# Patient Record
Sex: FEMALE | Race: WHITE | NOT HISPANIC OR LATINO | Employment: FULL TIME | URBAN - METROPOLITAN AREA
[De-identification: names, ages, dates, MRNs, and addresses within clinical notes are randomized per-mention and may not be internally consistent; named-entity substitution may affect disease eponyms.]

---

## 2023-08-18 PROBLEM — R22.0 LOCALIZED SWELLING, MASS, AND LUMP OF HEAD: Status: ACTIVE | Noted: 2023-08-18

## 2023-08-18 PROBLEM — Z15.01 BRCA2 POSITIVE: Status: ACTIVE | Noted: 2023-08-18

## 2023-08-18 PROBLEM — R30.0 DYSURIA: Status: ACTIVE | Noted: 2023-08-18

## 2023-08-18 PROBLEM — S83.207A TEAR OF MENISCUS OF LEFT KNEE: Status: ACTIVE | Noted: 2023-08-18

## 2023-08-18 PROBLEM — K21.9 GASTROESOPHAGEAL REFLUX DISEASE: Status: ACTIVE | Noted: 2023-08-18

## 2023-08-18 PROBLEM — M26.649 TMJ ARTHRITIS: Status: ACTIVE | Noted: 2023-08-18

## 2023-08-18 PROBLEM — Z15.09 BRCA2 POSITIVE: Status: ACTIVE | Noted: 2023-08-18

## 2023-08-18 PROBLEM — Z79.899 MEDICAL MARIJUANA USE: Status: ACTIVE | Noted: 2023-08-18

## 2023-08-18 PROBLEM — J38.2 VOCAL CORD NODULES: Status: ACTIVE | Noted: 2023-08-18

## 2023-08-18 PROBLEM — K11.8 PAROTID MASS: Status: ACTIVE | Noted: 2023-08-18

## 2023-08-18 PROBLEM — K64.9 HEMORRHOIDS: Status: ACTIVE | Noted: 2023-08-18

## 2023-08-18 PROBLEM — R49.0 DYSPHONIA: Status: ACTIVE | Noted: 2023-08-18

## 2023-08-18 PROBLEM — Z90.49 S/P CHOLECYSTECTOMY: Status: ACTIVE | Noted: 2023-08-18

## 2023-08-18 RX ORDER — OMEPRAZOLE 10 MG/1
10 CAPSULE, DELAYED RELEASE ORAL
COMMUNITY
End: 2024-01-03 | Stop reason: WASHOUT

## 2023-09-28 ENCOUNTER — APPOINTMENT (OUTPATIENT)
Dept: PRIMARY CARE | Facility: CLINIC | Age: 48
End: 2023-09-28
Payer: COMMERCIAL

## 2023-10-20 LAB
ALBUMIN SERPL BCP-MCNC: 3.8 G/DL (ref 3.4–5)
ALP SERPL-CCNC: 59 U/L (ref 33–110)
ALT SERPL W P-5'-P-CCNC: 10 U/L (ref 7–45)
ANION GAP SERPL CALC-SCNC: 10 MMOL/L (ref 10–20)
AST SERPL W P-5'-P-CCNC: 12 U/L (ref 9–39)
BASOPHILS # BLD AUTO: 0.06 X10*3/UL (ref 0–0.1)
BASOPHILS NFR BLD AUTO: 0.7 %
BILIRUB SERPL-MCNC: 0.4 MG/DL (ref 0–1.2)
BUN SERPL-MCNC: 10 MG/DL (ref 6–23)
CALCIUM SERPL-MCNC: 8.6 MG/DL (ref 8.6–10.3)
CHLORIDE SERPL-SCNC: 109 MMOL/L (ref 98–107)
CO2 SERPL-SCNC: 25 MMOL/L (ref 21–32)
CREAT SERPL-MCNC: 0.72 MG/DL (ref 0.5–1.05)
EOSINOPHIL # BLD AUTO: 0.35 X10*3/UL (ref 0–0.7)
EOSINOPHIL NFR BLD AUTO: 4.3 %
ERYTHROCYTE [DISTWIDTH] IN BLOOD BY AUTOMATED COUNT: 13.1 % (ref 11.5–14.5)
GFR SERPL CREATININE-BSD FRML MDRD: >90 ML/MIN/1.73M*2
GLUCOSE SERPL-MCNC: 103 MG/DL (ref 74–99)
HCT VFR BLD AUTO: 35.3 % (ref 36–46)
HGB BLD-MCNC: 11.3 G/DL (ref 12–16)
IMM GRANULOCYTES # BLD AUTO: 0.01 X10*3/UL (ref 0–0.7)
IMM GRANULOCYTES NFR BLD AUTO: 0.1 % (ref 0–0.9)
LIPASE SERPL-CCNC: 25 U/L (ref 9–82)
LYMPHOCYTES # BLD AUTO: 2.14 X10*3/UL (ref 1.2–4.8)
LYMPHOCYTES NFR BLD AUTO: 26.1 %
MCH RBC QN AUTO: 28.9 PG (ref 26–34)
MCHC RBC AUTO-ENTMCNC: 32 G/DL (ref 32–36)
MCV RBC AUTO: 90 FL (ref 80–100)
MONOCYTES # BLD AUTO: 0.6 X10*3/UL (ref 0.1–1)
MONOCYTES NFR BLD AUTO: 7.3 %
NEUTROPHILS # BLD AUTO: 5.04 X10*3/UL (ref 1.2–7.7)
NEUTROPHILS NFR BLD AUTO: 61.5 %
NRBC BLD-RTO: 0 /100 WBCS (ref 0–0)
PLATELET # BLD AUTO: 239 X10*3/UL (ref 150–450)
PMV BLD AUTO: 11.8 FL (ref 7.5–11.5)
POTASSIUM SERPL-SCNC: 4.2 MMOL/L (ref 3.5–5.3)
PROT SERPL-MCNC: 6.6 G/DL (ref 6.4–8.2)
RBC # BLD AUTO: 3.91 X10*6/UL (ref 4–5.2)
SODIUM SERPL-SCNC: 140 MMOL/L (ref 136–145)
WBC # BLD AUTO: 8.2 X10*3/UL (ref 4.4–11.3)

## 2023-10-20 PROCEDURE — 83690 ASSAY OF LIPASE: CPT | Performed by: EMERGENCY MEDICINE

## 2023-10-20 PROCEDURE — 80053 COMPREHEN METABOLIC PANEL: CPT | Performed by: EMERGENCY MEDICINE

## 2023-10-20 PROCEDURE — 85025 COMPLETE CBC W/AUTO DIFF WBC: CPT | Performed by: EMERGENCY MEDICINE

## 2023-10-20 PROCEDURE — 96375 TX/PRO/DX INJ NEW DRUG ADDON: CPT

## 2023-10-20 PROCEDURE — 84702 CHORIONIC GONADOTROPIN TEST: CPT

## 2023-10-20 PROCEDURE — 96365 THER/PROPH/DIAG IV INF INIT: CPT

## 2023-10-20 PROCEDURE — 99285 EMERGENCY DEPT VISIT HI MDM: CPT | Performed by: EMERGENCY MEDICINE

## 2023-10-20 PROCEDURE — 99284 EMERGENCY DEPT VISIT MOD MDM: CPT | Performed by: EMERGENCY MEDICINE

## 2023-10-20 PROCEDURE — 36415 COLL VENOUS BLD VENIPUNCTURE: CPT | Performed by: EMERGENCY MEDICINE

## 2023-10-20 ASSESSMENT — COLUMBIA-SUICIDE SEVERITY RATING SCALE - C-SSRS
2. HAVE YOU ACTUALLY HAD ANY THOUGHTS OF KILLING YOURSELF?: NO
1. IN THE PAST MONTH, HAVE YOU WISHED YOU WERE DEAD OR WISHED YOU COULD GO TO SLEEP AND NOT WAKE UP?: NO
6. HAVE YOU EVER DONE ANYTHING, STARTED TO DO ANYTHING, OR PREPARED TO DO ANYTHING TO END YOUR LIFE?: NO

## 2023-10-20 ASSESSMENT — PAIN - FUNCTIONAL ASSESSMENT: PAIN_FUNCTIONAL_ASSESSMENT: 0-10

## 2023-10-20 ASSESSMENT — PAIN SCALES - GENERAL: PAINLEVEL_OUTOF10: 7

## 2023-10-21 ENCOUNTER — HOSPITAL ENCOUNTER (EMERGENCY)
Facility: HOSPITAL | Age: 48
Discharge: HOME | End: 2023-10-21
Attending: EMERGENCY MEDICINE
Payer: COMMERCIAL

## 2023-10-21 ENCOUNTER — APPOINTMENT (OUTPATIENT)
Dept: RADIOLOGY | Facility: HOSPITAL | Age: 48
End: 2023-10-21
Payer: COMMERCIAL

## 2023-10-21 VITALS
BODY MASS INDEX: 40.12 KG/M2 | OXYGEN SATURATION: 98 % | SYSTOLIC BLOOD PRESSURE: 140 MMHG | RESPIRATION RATE: 16 BRPM | HEART RATE: 64 BPM | WEIGHT: 218 LBS | TEMPERATURE: 97.5 F | DIASTOLIC BLOOD PRESSURE: 81 MMHG | HEIGHT: 62 IN

## 2023-10-21 DIAGNOSIS — R10.84 ABDOMINAL PAIN, GENERALIZED: ICD-10-CM

## 2023-10-21 DIAGNOSIS — K57.92 DIVERTICULITIS: Primary | ICD-10-CM

## 2023-10-21 LAB
APPEARANCE UR: ABNORMAL
B-HCG SERPL-ACNC: <2 MIU/ML
BILIRUB UR STRIP.AUTO-MCNC: NEGATIVE MG/DL
COLOR UR: YELLOW
GLUCOSE UR STRIP.AUTO-MCNC: NEGATIVE MG/DL
HOLD SPECIMEN: NORMAL
KETONES UR STRIP.AUTO-MCNC: ABNORMAL MG/DL
LEUKOCYTE ESTERASE UR QL STRIP.AUTO: ABNORMAL
NITRITE UR QL STRIP.AUTO: NEGATIVE
PH UR STRIP.AUTO: 6 [PH]
PROT UR STRIP.AUTO-MCNC: NEGATIVE MG/DL
RBC # UR STRIP.AUTO: ABNORMAL /UL
RBC #/AREA URNS AUTO: >20 /HPF
SP GR UR STRIP.AUTO: 1.02
SQUAMOUS #/AREA URNS AUTO: ABNORMAL /HPF
UROBILINOGEN UR STRIP.AUTO-MCNC: <2 MG/DL
WBC #/AREA URNS AUTO: ABNORMAL /HPF

## 2023-10-21 PROCEDURE — 2500000001 HC RX 250 WO HCPCS SELF ADMINISTERED DRUGS (ALT 637 FOR MEDICARE OP): Performed by: EMERGENCY MEDICINE

## 2023-10-21 PROCEDURE — 74177 CT ABD & PELVIS W/CONTRAST: CPT

## 2023-10-21 PROCEDURE — 87086 URINE CULTURE/COLONY COUNT: CPT | Performed by: EMERGENCY MEDICINE

## 2023-10-21 PROCEDURE — 2550000001 HC RX 255 CONTRASTS

## 2023-10-21 PROCEDURE — 2500000004 HC RX 250 GENERAL PHARMACY W/ HCPCS (ALT 636 FOR OP/ED)

## 2023-10-21 PROCEDURE — 2550000001 HC RX 255 CONTRASTS: Performed by: EMERGENCY MEDICINE

## 2023-10-21 PROCEDURE — 74177 CT ABD & PELVIS W/CONTRAST: CPT | Performed by: RADIOLOGY

## 2023-10-21 PROCEDURE — 87086 URINE CULTURE/COLONY COUNT: CPT | Mod: STJLAB | Performed by: EMERGENCY MEDICINE

## 2023-10-21 PROCEDURE — 81001 URINALYSIS AUTO W/SCOPE: CPT | Performed by: EMERGENCY MEDICINE

## 2023-10-21 RX ORDER — AMOXICILLIN AND CLAVULANATE POTASSIUM 875; 125 MG/1; MG/1
1 TABLET, FILM COATED ORAL EVERY 12 HOURS
Qty: 14 TABLET | Refills: 0 | Status: SHIPPED | OUTPATIENT
Start: 2023-10-21 | End: 2023-10-28

## 2023-10-21 RX ORDER — OXYCODONE AND ACETAMINOPHEN 5; 325 MG/1; MG/1
1 TABLET ORAL ONCE
Status: COMPLETED | OUTPATIENT
Start: 2023-10-21 | End: 2023-10-21

## 2023-10-21 RX ORDER — ONDANSETRON HYDROCHLORIDE 2 MG/ML
4 INJECTION, SOLUTION INTRAVENOUS ONCE
Status: COMPLETED | OUTPATIENT
Start: 2023-10-21 | End: 2023-10-21

## 2023-10-21 RX ORDER — HYDROCODONE BITARTRATE AND ACETAMINOPHEN 5; 325 MG/1; MG/1
1 TABLET ORAL EVERY 6 HOURS PRN
Qty: 10 TABLET | Refills: 0 | Status: SHIPPED | OUTPATIENT
Start: 2023-10-21 | End: 2023-11-08 | Stop reason: ALTCHOICE

## 2023-10-21 RX ADMIN — ONDANSETRON 4 MG: 2 INJECTION INTRAMUSCULAR; INTRAVENOUS at 04:42

## 2023-10-21 RX ADMIN — HYDROMORPHONE HYDROCHLORIDE 0.5 MG: 1 INJECTION, SOLUTION INTRAMUSCULAR; INTRAVENOUS; SUBCUTANEOUS at 04:42

## 2023-10-21 RX ADMIN — PIPERACILLIN SODIUM AND TAZOBACTAM SODIUM 3.38 G: 3; .375 INJECTION, SOLUTION INTRAVENOUS at 04:42

## 2023-10-21 RX ADMIN — SODIUM CHLORIDE, POTASSIUM CHLORIDE, SODIUM LACTATE AND CALCIUM CHLORIDE 1000 ML: 600; 310; 30; 20 INJECTION, SOLUTION INTRAVENOUS at 04:42

## 2023-10-21 RX ADMIN — IOHEXOL 75 ML: 350 INJECTION, SOLUTION INTRAVENOUS at 02:59

## 2023-10-21 RX ADMIN — OXYCODONE HYDROCHLORIDE AND ACETAMINOPHEN 1 TABLET: 5; 325 TABLET ORAL at 06:27

## 2023-10-21 ASSESSMENT — LIFESTYLE VARIABLES
REASON UNABLE TO ASSESS: NO
HAVE YOU EVER FELT YOU SHOULD CUT DOWN ON YOUR DRINKING: NO
HAVE PEOPLE ANNOYED YOU BY CRITICIZING YOUR DRINKING: NO
EVER FELT BAD OR GUILTY ABOUT YOUR DRINKING: NO
EVER HAD A DRINK FIRST THING IN THE MORNING TO STEADY YOUR NERVES TO GET RID OF A HANGOVER: NO

## 2023-10-21 ASSESSMENT — PAIN SCALES - GENERAL
PAINLEVEL_OUTOF10: 8
PAINLEVEL_OUTOF10: 3

## 2023-10-21 NOTE — ED PROVIDER NOTES
HPI   Chief Complaint   Patient presents with    Abdominal Pain     LUQ pain, states hx of diverticulitis, pain started tonight       48-year-old female present with chief complaint of left-sided abdominal pain.  States he has a long history of diverticulitis and states that this pain is related to this.  She came in today because it has become worse.  Otherwise denies any chest pain or shortness of breath.  No nausea or vomiting.  No blood in the stool blood in urine.  No fevers chills.  She has not been taking anything at home for this pain.  Denies any systemic signs or symptoms of infection.      History provided by:  Patient   used: No                        [place] Coma Scale Score: 15                  Patient History   Past Medical History:   Diagnosis Date    Nodules of vocal cords     Vocal fold nodule     Past Surgical History:   Procedure Laterality Date    GALLBLADDER SURGERY  05/30/2014    Gallbladder Surgery     Family History   Problem Relation Name Age of Onset    Other (BRCA GENE POSITIVE) Mother      Hypertension Other      Diabetes Other       Social History     Tobacco Use    Smoking status: Former     Types: Cigarettes    Smokeless tobacco: Never   Substance Use Topics    Alcohol use: Yes    Drug use: Yes     Types: Marijuana       Physical Exam   ED Triage Vitals [10/20/23 2224]   Temp Heart Rate Resp BP   36.4 °C (97.5 °F) 93 18 142/71      SpO2 Temp src Heart Rate Source Patient Position   98 % -- -- --      BP Location FiO2 (%)     -- --       Physical Exam  Vitals and nursing note reviewed.   Constitutional:       General: She is not in acute distress.     Appearance: Normal appearance. She is not ill-appearing or toxic-appearing.   HENT:      Head: Normocephalic and atraumatic.      Right Ear: External ear normal.      Left Ear: External ear normal.      Nose: Nose normal.   Eyes:      General:         Right eye: No discharge.         Left eye: No discharge.       Extraocular Movements: Extraocular movements intact.      Conjunctiva/sclera: Conjunctivae normal.      Pupils: Pupils are equal, round, and reactive to light.   Cardiovascular:      Rate and Rhythm: Normal rate and regular rhythm.      Pulses: Normal pulses.      Heart sounds: Normal heart sounds. No murmur heard.  Pulmonary:      Effort: Pulmonary effort is normal.      Breath sounds: Normal breath sounds.   Abdominal:      General: There is no distension.      Palpations: Abdomen is soft. There is no mass.      Tenderness: There is abdominal tenderness in the left upper quadrant and left lower quadrant. There is guarding.   Musculoskeletal:         General: No deformity or signs of injury. Normal range of motion.   Skin:     General: Skin is warm and dry.   Neurological:      General: No focal deficit present.      Mental Status: She is alert and oriented to person, place, and time. Mental status is at baseline.   Psychiatric:         Mood and Affect: Mood normal.         Behavior: Behavior normal.         ED Course & MDM   Diagnoses as of 10/21/23 2224   Diverticulitis   Abdominal pain, generalized       Medical Decision Making  48-year-old female present with chief complaint of  left side abdominal pain that she states is related to her diverticulitis.  She has been on antibiotics recently , but she did not take these correctly she took her Cipro first and then the Flagyl afterwards she is not sure that she is supposed to take that at the same time.  Patient has been having worsening pain.  Concern for worsening of her diverticulitis.  At this time we will obtain a CT of the abdomen pelvis IV contrast to evaluate for worsening of her diverticulitis and any evidence of perforation or abscess formation.  We will give a dose of Zosyn empirically.  She is also given IV Zofran and Dilaudid for pain management.    CT scan did show evidence of diverticulitis although this is uncomplicated.  Lab work is overall  unremarkable.  Patient is overall stable right now and her pain is better controlled after she received IV medications.  No need for further work-up in the ER or admission to the hospital at this point.  She was advised to follow-up with her primary care provider and with the surgery as outpatient for ongoing management and evaluation of her symptoms.  She was given a prescription for antibiotics and pain management with Norco.  Return precautions were discussed.  She was discharged home stable condition.    Please see ED course for the rest of the MDM.    Mazin Toro DO, PGY-2  Emergency Medicine    Plan of care discussed with the attending physician.        Procedure  Procedures     Mazin Toro DO  Resident  10/21/23 0730

## 2023-10-22 LAB — BACTERIA UR CULT: NORMAL

## 2023-10-24 ENCOUNTER — APPOINTMENT (OUTPATIENT)
Dept: SURGICAL ONCOLOGY | Facility: HOSPITAL | Age: 48
End: 2023-10-24
Payer: COMMERCIAL

## 2023-11-01 ENCOUNTER — TELEPHONE (OUTPATIENT)
Dept: OBSTETRICS AND GYNECOLOGY | Facility: CLINIC | Age: 48
End: 2023-11-01
Payer: COMMERCIAL

## 2023-11-01 NOTE — TELEPHONE ENCOUNTER
Pt previously had diverticulitis 3 weeks ago and was in the hospital a week later, she then took 3 rounds of antibiotics and believes it caused a yeast infection. She goes on vacation tomorrow at 3pm and is requesting a pill she had been given previously in the past to treat. She said she's tried at home methods that haven't helped. I tried to look for an appt for tomorrow early morning and do not yet see anything. Let me know what I am able to tell her as I know we normally have to see the pt to prescribe

## 2023-11-02 NOTE — TELEPHONE ENCOUNTER
Spoke with pt and explained that since she not been seen in a year we would not be able to prescribe anything unless she is seen. Pt verbalized her understanding and I did advise that she try the Monistat 1 or 7 day treatment since she is leaving for vacation today at 3pm. Pt will try this. While on the phone, I  did get her scheduled for an annual exam with Laura on 1/30/2024. Pt states she is BRCA positive and would like to discuss what this means GYN wise at her appointment. No further questions at this time.

## 2023-11-08 ENCOUNTER — OFFICE VISIT (OUTPATIENT)
Dept: OBSTETRICS AND GYNECOLOGY | Facility: CLINIC | Age: 48
End: 2023-11-08
Payer: COMMERCIAL

## 2023-11-08 VITALS
WEIGHT: 219 LBS | SYSTOLIC BLOOD PRESSURE: 122 MMHG | HEIGHT: 62 IN | DIASTOLIC BLOOD PRESSURE: 78 MMHG | BODY MASS INDEX: 40.3 KG/M2

## 2023-11-08 DIAGNOSIS — N76.0 ACUTE VAGINITIS: Primary | ICD-10-CM

## 2023-11-08 DIAGNOSIS — B37.31 CANDIDA VAGINITIS: ICD-10-CM

## 2023-11-08 PROCEDURE — 99212 OFFICE O/P EST SF 10 MIN: CPT | Performed by: ADVANCED PRACTICE MIDWIFE

## 2023-11-08 PROCEDURE — 87210 SMEAR WET MOUNT SALINE/INK: CPT | Performed by: ADVANCED PRACTICE MIDWIFE

## 2023-11-08 PROCEDURE — 1036F TOBACCO NON-USER: CPT | Performed by: ADVANCED PRACTICE MIDWIFE

## 2023-11-08 RX ORDER — FLUCONAZOLE 150 MG/1
150 TABLET ORAL ONCE
Qty: 1 TABLET | Refills: 0 | Status: SHIPPED | OUTPATIENT
Start: 2023-11-08 | End: 2023-11-08

## 2023-11-08 NOTE — PROGRESS NOTES
Subjective   Patient ID: Di Valentin is a 48 y.o. female who presents for Vaginitis/Bacterial Vaginosis.      HPI  Patient having abnormal vaginal itching, and discharge (Started two weeks ago)  Reports multiple antibiotic use recently  Patient used Monistat 3 and it did not work.    Review of Systems  See HPI    Objective   Physical Exam  Constitutional:       General: She is not in acute distress.  Pulmonary:      Effort: Pulmonary effort is normal.   Genitourinary:     General: Normal vulva.      Cervix: Normal.      Uterus: Normal.       Adnexa: Right adnexa normal and left adnexa normal.      Comments: Scant white vaginal discharge  Skin:     General: Skin is warm and dry.      Findings: No lesion.   Neurological:      Mental Status: She is alert.      See [place]    Assessment/Plan   Diagnoses and all orders for this visit:  Acute vaginitis  -     POCT [place] manually resulted  Candida vaginitis  -     fluconazole (Diflucan) 150 mg tablet; Take 1 tablet (150 mg) by mouth 1 time for 1 dose.    RTC for symptoms not resolving or PRN

## 2023-11-11 LAB
POC CLUE CELL WET PREP: ABNORMAL
POC TRICHOMONAS WET PREP: ABNORMAL
POC WBC WET PREP: ABNORMAL
POC YEAST CELLS WET PREP: PRESENT

## 2023-11-27 ENCOUNTER — TELEMEDICINE (OUTPATIENT)
Dept: GASTROENTEROLOGY | Facility: CLINIC | Age: 48
End: 2023-11-27
Payer: COMMERCIAL

## 2023-11-27 DIAGNOSIS — K57.92 DIVERTICULITIS: Primary | ICD-10-CM

## 2023-11-27 PROCEDURE — 99204 OFFICE O/P NEW MOD 45 MIN: CPT | Performed by: NURSE PRACTITIONER

## 2023-11-27 ASSESSMENT — ENCOUNTER SYMPTOMS
HEMATOLOGIC/LYMPHATIC NEGATIVE: 1
RESPIRATORY NEGATIVE: 1
COUGH: 0
ALLERGIC/IMMUNOLOGIC NEGATIVE: 1
MUSCULOSKELETAL NEGATIVE: 1
WHEEZING: 0
STRIDOR: 0
DIFFICULTY URINATING: 0
PSYCHIATRIC NEGATIVE: 1
APNEA: 0
ROS GI COMMENTS: SEE HPI
ENDOCRINE NEGATIVE: 1
CARDIOVASCULAR NEGATIVE: 1
FATIGUE: 0
NEUROLOGICAL NEGATIVE: 1
CHILLS: 0
SHORTNESS OF BREATH: 0
EYES NEGATIVE: 1
CHEST TIGHTNESS: 0
DIAPHORESIS: 0
FEVER: 0

## 2023-11-27 NOTE — PROGRESS NOTES
Subjective   Patient ID: Di Valentin is a 48 y.o. female who presents for Diverticulitis.  Diverticulitis a year ago, CT at that time and she was treated with ATBs, avoid Nuts and Seeds. Had to have 2 rounds  About a week ago, she was in [place] and started having pain, she had a CT x 2 and both showed diverticulitis.  The initial CT scan was in [place], she was in severe pain at this time. She was given Cefir and Flagyl and dilaudid. The pain returned a week later. She returned to ER in [place].     Currently, everything has resolved.     She has BRCA gene.     She is gluten free, plant based diet.     She has not had a colonoscopy, friend had a colonoscopy and had to have a bag    Family Hx: She denies a family hx of CRC, GI cancer  Mom and m. Grandmother have BRCA and ovarian cancer (grandmother)          Review of Systems   Constitutional:  Negative for chills, diaphoresis, fatigue and fever.   HENT: Negative.     Eyes: Negative.    Respiratory: Negative.  Negative for apnea, cough, chest tightness, shortness of breath, wheezing and stridor.    Cardiovascular: Negative.    Gastrointestinal:         See HPI    Endocrine: Negative.    Genitourinary: Negative.  Negative for difficulty urinating.   Musculoskeletal: Negative.    Skin: Negative.    Allergic/Immunologic: Negative.    Neurological: Negative.    Hematological: Negative.    Psychiatric/Behavioral: Negative.         Objective   Physical Exam  Constitutional:       Appearance: She is normal weight.   Neurological:      Mental Status: She is alert.   Psychiatric:         Mood and Affect: Mood normal.        47 yo female with a PMH of obesity, cholecystectomy, parotid mass who presents today for follow up of diverticulitis infection. She has had two prior attacks of diverticulitis within the last two years. She is on a high fiber diet. She has never had a colonoscopy. She will undergo a colonoscopy and start fiber.     Assessment/Plan   Diagnoses and all orders  for this visit:  Diverticulitis  -     Colonoscopy Screening; Average Risk Patient; Future

## 2023-12-17 ENCOUNTER — TELEPHONE (OUTPATIENT)
Dept: HEMATOLOGY/ONCOLOGY | Facility: HOSPITAL | Age: 48
End: 2023-12-17
Payer: COMMERCIAL

## 2023-12-17 DIAGNOSIS — Z15.01 BRCA2 POSITIVE: Primary | ICD-10-CM

## 2023-12-17 DIAGNOSIS — Z15.09 BRCA2 POSITIVE: Primary | ICD-10-CM

## 2023-12-17 DIAGNOSIS — Z91.89 AT HIGH RISK FOR BREAST CANCER: ICD-10-CM

## 2023-12-17 DIAGNOSIS — Z12.39 BREAST CANCER SCREENING, HIGH RISK PATIENT: ICD-10-CM

## 2023-12-17 NOTE — TELEPHONE ENCOUNTER
Spoke with Di regarding her breast cancer screening.  She had her mammogram in 7/2023, so MRI would be due 1/2024.  Based on BRCA mutation, I do recommend alternating mammogram/MRI.  Order placed.

## 2023-12-20 ENCOUNTER — TELEPHONE (OUTPATIENT)
Dept: HEMATOLOGY/ONCOLOGY | Facility: CLINIC | Age: 48
End: 2023-12-20

## 2023-12-20 DIAGNOSIS — Z15.09 BRCA2 POSITIVE: Primary | ICD-10-CM

## 2023-12-20 DIAGNOSIS — Z91.89 AT HIGH RISK FOR BREAST CANCER: ICD-10-CM

## 2023-12-20 DIAGNOSIS — Z12.39 BREAST CANCER SCREENING, HIGH RISK PATIENT: ICD-10-CM

## 2023-12-20 DIAGNOSIS — Z15.01 BRCA2 POSITIVE: Primary | ICD-10-CM

## 2024-01-03 DIAGNOSIS — Z12.11 COLON CANCER SCREENING: ICD-10-CM

## 2024-01-03 PROBLEM — F41.9 ANXIETY: Status: ACTIVE | Noted: 2024-01-03

## 2024-01-03 PROBLEM — K21.9 GERD (GASTROESOPHAGEAL REFLUX DISEASE): Status: ACTIVE | Noted: 2024-01-03

## 2024-01-03 RX ORDER — ACETAMINOPHEN 500 MG
1 TABLET ORAL
COMMUNITY
Start: 2014-02-11 | End: 2024-01-30 | Stop reason: WASHOUT

## 2024-01-03 RX ORDER — LIDOCAINE 50 MG/G
PATCH TOPICAL
COMMUNITY
Start: 2023-10-07 | End: 2024-01-30 | Stop reason: WASHOUT

## 2024-01-03 RX ORDER — ASCORBIC ACID 500 MG
500 TABLET ORAL
COMMUNITY
Start: 2011-06-13 | End: 2024-01-30 | Stop reason: WASHOUT

## 2024-01-03 RX ORDER — FERROUS SULFATE 325(65) MG
1 TABLET ORAL
COMMUNITY
Start: 2021-11-05 | End: 2024-01-30 | Stop reason: WASHOUT

## 2024-01-03 RX ORDER — TRAMADOL HYDROCHLORIDE 50 MG/1
TABLET ORAL
COMMUNITY
Start: 2022-02-14 | End: 2024-01-03 | Stop reason: WASHOUT

## 2024-01-03 RX ORDER — CLONAZEPAM 0.5 MG/1
TABLET ORAL
COMMUNITY
Start: 2023-12-15 | End: 2024-01-30 | Stop reason: WASHOUT

## 2024-01-03 RX ORDER — LANOLIN ALCOHOL/MO/W.PET/CERES
1000 CREAM (GRAM) TOPICAL
COMMUNITY
Start: 2014-02-11 | End: 2024-01-30 | Stop reason: WASHOUT

## 2024-01-03 RX ORDER — OMEPRAZOLE 20 MG/1
20 CAPSULE, DELAYED RELEASE ORAL DAILY
COMMUNITY
Start: 2023-08-21

## 2024-01-07 RX ORDER — SODIUM, POTASSIUM,MAG SULFATES 17.5-3.13G
1 SOLUTION, RECONSTITUTED, ORAL ORAL EVERY 12 HOURS
Qty: 2 EACH | Refills: 0 | Status: SHIPPED | OUTPATIENT
Start: 2024-01-07

## 2024-01-08 ENCOUNTER — ANESTHESIA (OUTPATIENT)
Dept: GASTROENTEROLOGY | Facility: HOSPITAL | Age: 49
End: 2024-01-08
Payer: COMMERCIAL

## 2024-01-08 ENCOUNTER — HOSPITAL ENCOUNTER (OUTPATIENT)
Dept: GASTROENTEROLOGY | Facility: HOSPITAL | Age: 49
Setting detail: OUTPATIENT SURGERY
Discharge: HOME | End: 2024-01-08
Payer: COMMERCIAL

## 2024-01-08 ENCOUNTER — ANESTHESIA EVENT (OUTPATIENT)
Dept: GASTROENTEROLOGY | Facility: HOSPITAL | Age: 49
End: 2024-01-08
Payer: COMMERCIAL

## 2024-01-08 VITALS
SYSTOLIC BLOOD PRESSURE: 119 MMHG | TEMPERATURE: 98.1 F | OXYGEN SATURATION: 100 % | RESPIRATION RATE: 17 BRPM | HEIGHT: 62 IN | WEIGHT: 213 LBS | BODY MASS INDEX: 39.2 KG/M2 | HEART RATE: 64 BPM | DIASTOLIC BLOOD PRESSURE: 77 MMHG

## 2024-01-08 DIAGNOSIS — K57.92 DIVERTICULITIS: ICD-10-CM

## 2024-01-08 LAB — HCG UR QL IA.RAPID: NEGATIVE

## 2024-01-08 PROCEDURE — 81025 URINE PREGNANCY TEST: CPT | Performed by: INTERNAL MEDICINE

## 2024-01-08 PROCEDURE — 7100000009 HC PHASE TWO TIME - INITIAL BASE CHARGE

## 2024-01-08 PROCEDURE — 2500000005 HC RX 250 GENERAL PHARMACY W/O HCPCS: Performed by: NURSE ANESTHETIST, CERTIFIED REGISTERED

## 2024-01-08 PROCEDURE — A45378 PR COLONOSCOPY,DIAGNOSTIC: Performed by: ANESTHESIOLOGY

## 2024-01-08 PROCEDURE — 3700000001 HC GENERAL ANESTHESIA TIME - INITIAL BASE CHARGE

## 2024-01-08 PROCEDURE — 7100000010 HC PHASE TWO TIME - EACH INCREMENTAL 1 MINUTE

## 2024-01-08 PROCEDURE — A45378 PR COLONOSCOPY,DIAGNOSTIC: Performed by: NURSE ANESTHETIST, CERTIFIED REGISTERED

## 2024-01-08 PROCEDURE — 45378 DIAGNOSTIC COLONOSCOPY: CPT | Performed by: INTERNAL MEDICINE

## 2024-01-08 PROCEDURE — 3700000002 HC GENERAL ANESTHESIA TIME - EACH INCREMENTAL 1 MINUTE

## 2024-01-08 PROCEDURE — 2500000004 HC RX 250 GENERAL PHARMACY W/ HCPCS (ALT 636 FOR OP/ED): Performed by: NURSE ANESTHETIST, CERTIFIED REGISTERED

## 2024-01-08 RX ORDER — SODIUM CHLORIDE, SODIUM LACTATE, POTASSIUM CHLORIDE, CALCIUM CHLORIDE 600; 310; 30; 20 MG/100ML; MG/100ML; MG/100ML; MG/100ML
INJECTION, SOLUTION INTRAVENOUS CONTINUOUS PRN
Status: DISCONTINUED | OUTPATIENT
Start: 2024-01-08 | End: 2024-01-08

## 2024-01-08 RX ORDER — MIDAZOLAM HYDROCHLORIDE 1 MG/ML
INJECTION, SOLUTION INTRAMUSCULAR; INTRAVENOUS AS NEEDED
Status: DISCONTINUED | OUTPATIENT
Start: 2024-01-08 | End: 2024-01-08

## 2024-01-08 RX ORDER — PROPOFOL 10 MG/ML
INJECTION, EMULSION INTRAVENOUS CONTINUOUS PRN
Status: DISCONTINUED | OUTPATIENT
Start: 2024-01-08 | End: 2024-01-08

## 2024-01-08 RX ORDER — LIDOCAINE HYDROCHLORIDE 20 MG/ML
INJECTION, SOLUTION EPIDURAL; INFILTRATION; INTRACAUDAL; PERINEURAL AS NEEDED
Status: DISCONTINUED | OUTPATIENT
Start: 2024-01-08 | End: 2024-01-08

## 2024-01-08 RX ORDER — PROPOFOL 10 MG/ML
INJECTION, EMULSION INTRAVENOUS AS NEEDED
Status: DISCONTINUED | OUTPATIENT
Start: 2024-01-08 | End: 2024-01-08

## 2024-01-08 RX ADMIN — MIDAZOLAM 2 MG: 1 INJECTION INTRAMUSCULAR; INTRAVENOUS at 08:48

## 2024-01-08 RX ADMIN — PROPOFOL 20 MG: 10 INJECTION, EMULSION INTRAVENOUS at 08:55

## 2024-01-08 RX ADMIN — LIDOCAINE HYDROCHLORIDE 50 MG: 20 INJECTION, SOLUTION EPIDURAL; INFILTRATION; INTRACAUDAL; PERINEURAL at 08:52

## 2024-01-08 RX ADMIN — PROPOFOL 30 MG: 10 INJECTION, EMULSION INTRAVENOUS at 08:52

## 2024-01-08 RX ADMIN — PROPOFOL 30 MG: 10 INJECTION, EMULSION INTRAVENOUS at 08:53

## 2024-01-08 RX ADMIN — PROPOFOL 150 MCG/KG/MIN: 10 INJECTION, EMULSION INTRAVENOUS at 08:52

## 2024-01-08 RX ADMIN — SODIUM CHLORIDE, POTASSIUM CHLORIDE, SODIUM LACTATE AND CALCIUM CHLORIDE: 600; 310; 30; 20 INJECTION, SOLUTION INTRAVENOUS at 08:48

## 2024-01-08 SDOH — HEALTH STABILITY: MENTAL HEALTH: CURRENT SMOKER: 0

## 2024-01-08 ASSESSMENT — PAIN SCALES - GENERAL
PAINLEVEL_OUTOF10: 0 - NO PAIN

## 2024-01-08 ASSESSMENT — COLUMBIA-SUICIDE SEVERITY RATING SCALE - C-SSRS
1. IN THE PAST MONTH, HAVE YOU WISHED YOU WERE DEAD OR WISHED YOU COULD GO TO SLEEP AND NOT WAKE UP?: NO
2. HAVE YOU ACTUALLY HAD ANY THOUGHTS OF KILLING YOURSELF?: NO
6. HAVE YOU EVER DONE ANYTHING, STARTED TO DO ANYTHING, OR PREPARED TO DO ANYTHING TO END YOUR LIFE?: NO

## 2024-01-08 ASSESSMENT — PAIN - FUNCTIONAL ASSESSMENT
PAIN_FUNCTIONAL_ASSESSMENT: 0-10

## 2024-01-08 NOTE — ANESTHESIA POSTPROCEDURE EVALUATION
Patient: Di Valentin    Procedure Summary       Date: 01/08/24 [facility]    Anesthesia Start: 0848 Anesthesia Stop: 0918    Procedure: COLONOSCOPY Diagnosis: Diverticulitis    Scheduled Providers: Isabel BECK MD Responsible Provider: Boo Chavez MD    Anesthesia Type: MAC ASA Status: 2            Anesthesia Type: MAC    Vitals Value Taken Time   /68 01/08/24 0933   Temp 36.4 °C (97.5 °F) 01/08/24 0921   Pulse 69 01/08/24 0933   Resp 18 01/08/24 0933   SpO2 96 % 01/08/24 0933       Anesthesia Post Evaluation    Patient location during evaluation: PACU  Patient participation: complete - patient cannot participate  Level of consciousness: awake and alert  Pain management: satisfactory to patient  Airway patency: patent  Cardiovascular status: acceptable  Respiratory status: acceptable  Hydration status: acceptable  Postoperative Nausea and Vomiting: none        No notable events documented.

## 2024-01-08 NOTE — H&P
Outpatient Hospital Procedure    Patient Profile-Procedures  Initial Info  Patient Demographics  Name Di Valentin  Date of Birth 1975  MRN [id number]  Address   [address] [facility] [id number] [address] [facility] [id number]    Primary Phone Number [phone]  Secondary Phone Number    Edna Lin    Procedures   Colonoscopy      Indication:  Screening - hx of diverticulitis    Primary contact name and number   Extended Emergency Contact Information  Primary Emergency Contact: Uriel Clemons  Home Phone: [phone]  Relation: Parent    General Health  Weight   Vitals:    01/08/24 0809   Weight: 96.6 kg (213 lb)     BMI Body mass index is 38.96 kg/m².    Allergies  Allergies   Allergen Reactions    Morphine Hives and Itching       Past Medical History   Past Medical History:   Diagnosis Date    Nodules of vocal cords     Vocal fold nodule       Provider assessment  Diagnosis  Medication Reviewed - yes  Prior to Admission medications    Medication Sig Start Date End Date Taking? Authorizing Provider   ascorbic acid (Vitamin C) 500 mg tablet Take 1 tablet (500 mg) by mouth once daily. 6/13/11  Yes Historical Provider, MD   cholecalciferol (Vitamin D-3) 50 mcg (2,000 unit) capsule Take 1 capsule (50 mcg) by mouth once daily. 2/11/14  Yes Historical Provider, MD   clonazePAM (KlonoPIN) 0.5 mg tablet TAKE 1 TABLET BY MOUTH ONCE DAILY FOR 2 DOSES. CAN TAKE DAY BEFORE AND DAY OF COLONOSCOPY. 12/15/23  Yes Historical Provider, MD   cyanocobalamin (Vitamin B-12) 1,000 mcg tablet Take 1 tablet (1,000 mcg) by mouth once daily. 2/11/14  Yes Historical Provider, MD   ferrous sulfate, 325 mg ferrous sulfate, tablet Take 1 tablet by mouth once daily with breakfast. 11/5/21  Yes Historical Provider, MD   MULTIVITAMIN ORAL Take by mouth.   Yes Historical Provider, MD   omega-3 fatty acids 500 mg capsule Take 1 capsule (500 mg) by mouth once daily. 2/11/14  Yes Historical Provider, MD   omeprazole  (PriLOSEC) 20 mg DR capsule Take 1 capsule (20 mg) by mouth once daily. 8/21/23  Yes Historical Provider, MD   sodium,potassium,mag sulfates (Suprep Bowel Prep Kit) 17.5-3.13-1.6 gram recon soln solution Take 1 bottle by mouth every 12 hours. 1/7/24  Yes Isabel BECK MD   lidocaine (Lidoderm) 5 % patch  10/7/23   Historical Provider, MD   omeprazole (PriLOSEC) 10 mg DR capsule Take 1 capsule (10 mg) by mouth once daily in the morning. Take before meals.  1/3/24  Historical Provider, MD   traMADol (Ultram) 50 mg tablet Take 1 tablet 1 hour prior to bed time as needed. 2/14/22 1/3/24  Historical Provider, MD       This is my H&P    Physical Exam  Physical Exam  Vitals reviewed.   Constitutional:       General: She is awake.   Cardiovascular:      Rate and Rhythm: Normal rate and regular rhythm.   Pulmonary:      Effort: Pulmonary effort is normal.      Breath sounds: Normal breath sounds.   Neurological:      Mental Status: She is alert and oriented to person, place, and time.   Psychiatric:         Attention and Perception: Attention and perception normal.         Behavior: Behavior normal.           Oropharyngeal Classification II (hard and soft palate, upper portion of tonsils anduvula visible)  ASA PS Classification 2  Sedation Plan Deep  Procedure Plan - pre-procedural (re)assesment completed by physician:  discharge/transfer patient when discharge criteria met    Isabel Tejeda MD  1/8/2024 8:49 AM

## 2024-01-08 NOTE — DISCHARGE INSTRUCTIONS
Patient Instructions Post Endoscopy Procedure      The anesthetics, sedatives or narcotics which were given to you today will be acting in your body for the next 24 hours, so you might feel a little sleepy or groggy.  This feeling should slowly wear off. Carefully read and follow the instructions.     You received sedation today:  - Do not drive or operate any machinery or power tools of any kind.   - No alcoholic beverages today, not even beer or wine.  - Do not make any important decisions or sign any legal documents.  - No over the counter medications that contain alcohol or that may cause drowsiness.    While it is common to experience mild to moderate abdominal distention, gas, or belching after your procedure, if any of these symptoms occur following discharge from the GI Lab or within one week of having your procedure, call the [facility] to be advised whether a visit to your nearest Urgent Care or Emergency Department is indicated.  Take this paper with you if you go.   - If you develop an allergic reaction to the medications that were given during your procedure such as difficulty breathing, rash, hives, severe nausea, vomiting or lightheadedness.  - If you experience chest pain, shortness of breath, severe abdominal pain, fevers and chills.  -If you develop signs and symptoms of bleeding such as blood in your spit, if your stools turn black, tarry, or bloody  - If you have not urinated within 8 hours following your procedure.  - If your IV site becomes painful, red, inflamed, or looks infected.    If you received a biopsy/polypectomy the following instructions apply below:  __ Do not use non-steroidal medications or anti-coagulants for one week following your procedure. (Examples of these types of medications are: Advil, Arthrotec, Aleve, Coumadin, Ecotrin, Heparin, Ibuprofen, Indocin, Motrin, Naprosyn, Nuprin, Plavix, Vioxx, and Voltarin, or their generic forms.  This list is not  all-inclusive.  Check with your physician or pharmacist before resuming medications.)   __ Eat a soft diet today.  Avoid foods that are poorly digested for the next 24 hours.  These foods would include: nuts, beans, lettuce, red meats, and fried foods. Start with liquids and advance your diet as tolerated, gradually work up to eating solids.   __ You can restart your ASA tomorrow  __ You can resume your anticoagulation therapy -     Your physician recommends the additional following instructions:    -You have a contact number available for emergencies. The signs and symptoms of potential delayed complications were discussed with you. You may return to normal activities tomorrow.  -Resume your previous diet or other if specified.  -Continue your present medications.   -We are waiting for your pathology results, if applicable. The results will be available in [employer]. I will send you a message with any recommendations.  -The findings and recommendations have been discussed with you and/or family.  -Please see Medication Reconciliation Form for new medication/medications prescribed.     In the event of an emergency please go to the closest Emergency Department or call Dr. Tejeda at [phone]

## 2024-01-08 NOTE — ANESTHESIA PREPROCEDURE EVALUATION
Patient: Di Valentin    Procedure Information       Anesthesia Start Date/Time: 01/08/24 0848    Scheduled providers: Isabel BECK MD    Procedure: COLONOSCOPY    Location: [facility]            Relevant Problems   GI   (+) GERD (gastroesophageal reflux disease)   (+) Gastroesophageal reflux disease      Neuro/Psych   (+) Anxiety      Hematology   (+) Anemia      Other   (+) TMJ arthritis       Clinical information reviewed:   Tobacco  Allergies  Meds   Med Hx  Surg Hx  OB Status  Fam Hx  Soc   Hx        NPO Detail:  NPO/Void Status  Carbohydrate Drink Given Prior to Surgery? : N  Date of Last Liquid: 01/08/24  Time of Last Liquid: 0700  Date of Last Solid: 01/06/24  Time of Last Solid: 2300  Last Intake Type: Clear fluids  Time of Last Void: 0745         Physical Exam    Airway  Mallampati: II  TM distance: >3 FB     Cardiovascular   Rhythm: regular  Rate: normal     Dental - normal exam     Pulmonary   Breath sounds clear to auscultation     Abdominal            Anesthesia Plan    ASA 2     regional     The patient is not a current smoker.    intravenous induction   Anesthetic plan and risks discussed with patient.    Plan discussed with CRNA.

## 2024-01-16 ENCOUNTER — APPOINTMENT (OUTPATIENT)
Dept: RADIOLOGY | Facility: HOSPITAL | Age: 49
End: 2024-01-16
Payer: COMMERCIAL

## 2024-01-16 ENCOUNTER — HOSPITAL ENCOUNTER (OUTPATIENT)
Dept: RADIOLOGY | Facility: HOSPITAL | Age: 49
End: 2024-01-16
Payer: COMMERCIAL

## 2024-01-19 RX ORDER — SODIUM CHLORIDE 9 MG/ML
20 INJECTION, SOLUTION INTRAVENOUS CONTINUOUS
Status: ACTIVE | OUTPATIENT
Start: 2024-01-19

## 2024-01-30 ENCOUNTER — APPOINTMENT (OUTPATIENT)
Dept: OBSTETRICS AND GYNECOLOGY | Facility: CLINIC | Age: 49
End: 2024-01-30
Payer: COMMERCIAL

## 2024-01-30 ENCOUNTER — OFFICE VISIT (OUTPATIENT)
Dept: OBSTETRICS AND GYNECOLOGY | Facility: CLINIC | Age: 49
End: 2024-01-30
Payer: COMMERCIAL

## 2024-01-30 VITALS
HEIGHT: 62 IN | BODY MASS INDEX: 38.46 KG/M2 | SYSTOLIC BLOOD PRESSURE: 124 MMHG | DIASTOLIC BLOOD PRESSURE: 78 MMHG | WEIGHT: 209 LBS

## 2024-01-30 DIAGNOSIS — Z15.09 BRCA2 GENE MUTATION POSITIVE: ICD-10-CM

## 2024-01-30 DIAGNOSIS — Z01.419 WELL WOMAN EXAM WITH ROUTINE GYNECOLOGICAL EXAM: Primary | ICD-10-CM

## 2024-01-30 DIAGNOSIS — Z12.31 SCREENING MAMMOGRAM FOR BREAST CANCER: ICD-10-CM

## 2024-01-30 DIAGNOSIS — Z15.01 BRCA2 GENE MUTATION POSITIVE: ICD-10-CM

## 2024-01-30 PROCEDURE — 87624 HPV HI-RISK TYP POOLED RSLT: CPT

## 2024-01-30 PROCEDURE — 1036F TOBACCO NON-USER: CPT

## 2024-01-30 PROCEDURE — 88175 CYTOPATH C/V AUTO FLUID REDO: CPT

## 2024-01-30 PROCEDURE — 99396 PREV VISIT EST AGE 40-64: CPT

## 2024-01-30 NOTE — PROGRESS NOTES
"Patient being seen for annual exam  PAP: 10/4/17, 11/1/22 HPV - NML  Mammo: 7/27/23 NML  Patient would like to discuss uterine cancer screening    Subjective   Di Valentin is a 48 y.o. female who is here for a routine exam. No vaginal concerns at this time including abnormal discharge, odor or discomfort. She is sexually active with one male partner and has no concern for STI exposure. She has no pain or bleeding with sex. She denies bladder or bowel concerns.      Current contraception:  with vasectomy  LMP: December, is skipping some months  Last pap: 10/4/17, 11/1/22 HPV - NML  History of abnormal Pap smear: yes - Leep at age 22, will pap today   Due for pap: No. Will pap per patient request  Family history of uterine or ovarian cancer: yes - MGM ovarian ca in 60's  Family history of prostate cancer: no  Family history of pancreatic cancer: yes - MGM pancreatic in 80's  Family hx of BRCA1/BRCA2: yes - BRCA two family hx and patient positive  per genetics consult 5/2023  Family history of breast cancer: yes - great maternal aunt   Last mammogram: 7/2023 cat 1 neg        OB History    No obstetric history on file.         Review of Systems   All other systems reviewed and are negative.      Objective   /78   Ht 1.575 m (5' 2\")   Wt 94.8 kg (209 lb)   LMP 12/06/2023 Comment: Patient unsure of perimenopausal symptoms  BMI 38.23 kg/m²     Physical Exam  Constitutional:       Appearance: She is obese.   HENT:      Mouth/Throat:      Mouth: Mucous membranes are moist.   Eyes:      Conjunctiva/sclera: Conjunctivae normal.   Neck:      Thyroid: No thyroid mass, thyromegaly or thyroid tenderness.   Cardiovascular:      Rate and Rhythm: Normal rate and regular rhythm.      Pulses: Normal pulses.   Pulmonary:      Effort: Pulmonary effort is normal.      Breath sounds: Normal breath sounds.   Chest:   Breasts:     Breasts are symmetrical.      Right: Normal.      Left: Normal.   Abdominal:      General: " Abdomen is flat.      Palpations: Abdomen is soft.      Hernia: There is no hernia in the left inguinal area or right inguinal area.   Genitourinary:     General: Normal vulva.      Vagina: Normal.      Cervix: Normal.      Uterus: Normal.       Adnexa: Right adnexa normal and left adnexa normal.      Comments: Pap collected today  Musculoskeletal:         General: Normal range of motion.      Cervical back: Normal range of motion.   Lymphadenopathy:      Cervical: No cervical adenopathy.      Right cervical: No superficial, deep or posterior cervical adenopathy.     Left cervical: No superficial, deep or posterior cervical adenopathy.      Lower Body: No right inguinal adenopathy. No left inguinal adenopathy.   Skin:     General: Skin is warm.      Capillary Refill: Capillary refill takes less than 2 seconds.   Neurological:      Mental Status: She is alert and oriented to person, place, and time.   Psychiatric:         Mood and Affect: Mood normal.         Behavior: Behavior normal.          Assessment/Plan   A&P --> 48 y.o. y.o woman for annual GYN exam.     - Health Maintenance -->  - Routine follow up with PCP for health maintenance examination encouraged, including TSH, cholesterol, and Vit. D evaluation.  Self breast awareness encouraged; concerning characteristics of breasts reviewed - Pt. will report general concerns, any adherent lumps, skin dimpling/puckering or color changes, and any nipple discharge.    Diet and exercise reviewed: patient has lost 90 pounds over the last year and reports regular exercise with kickboxing and maintaining a healthy diet.     Pap: will pap today and if wnl, next pap will due in 5 years:- Reviewed ACOG and ASCCP guidelines with pt.     - BRCA two mutation: patient had genetic counseling completed last year and declined surgical management with mastectomy and oophorectomy as well as patient declining referral to [facility] 5 coordinator. Patient is amenable to yearly mammograms.  MRI previously ordered and patient did not pursue due to significant anxiety with confined spaces. Discussed with patient option of open MRI. Education provided on self breast exam, mammogram order placed, and patient will have breast MRI completed. Discussed case with Dr. Sanchez who also recommends that patient follow up with a referral to the high risk breast clinic as well as a referral to gyn-onc for counseling.      - STD Screening: declines     - F/U 1 year or as needed.      Tg Pan, DARIUS-HAROLDO

## 2024-03-04 NOTE — PROGRESS NOTES
Di Valentin female   1975 48 y.o.   [id number]      Chief Complaint  High risk evaluation, BRCA 2+    History Of Present Illness  Di Valentin is a 48 y.o. female [facility] by [facility] for high risk evaluation She has no family history of breast cancer. She has family history of ovarian cancer in her maternal grandmother, age 57, BRAC 2+. She denies breast surgery or biopsy. She underwent genetic testing in 2023, single site demonstrating BRCA 2 mutation.     BREAST IMAGIN2023 Bilateral screening mammogram, indicates BI-RADS Category 1.     REPRODUCTIVE HISTORY: menarche age 12, , first birth age 22, did not breastfeed, no OCP's, premenopausal, LMP, almost entirely fatty tissue     FAMILY CANCER HISTORY:   Mother: BRCA 2+, NO cancer  Maternal Grandmother: Ovarian cancer, age 57, Stomach cancer, age 75, Pancreatic cancer, age 80, BRCA 2 +       Surgical History  She has a past surgical history that includes Gallbladder surgery (2014) and Cervical biopsy w/ loop electrode excision.     Social History  She reports that she quit smoking about 25 years ago. Her smoking use included cigarettes. She has never used smokeless tobacco. She reports current alcohol use. She reports current drug use. Drug: Marijuana.    Family History  Family History   Problem Relation Name Age of Onset    Other (BRCA GENE POSITIVE) Mother      Hypertension Other      Diabetes Other          Allergies  Morphine    Medications  Current Outpatient Medications   Medication Instructions    omeprazole (PRILOSEC) 20 mg, oral, Daily    sodium,potassium,mag sulfates (Suprep Bowel Prep Kit) 17.5-3.13-1.6 gram recon soln solution 1 bottle, oral, Every 12 hours         REVIEW OF SYSTEMS    Constitutional:  Negative for appetite change, fatigue, fever and unexpected weight change.   HENT:  Negative for ear pain, hearing loss, nosebleeds, sore throat and trouble swallowing.    Eyes:  Negative for  discharge, itching and visual disturbance.   Respiratory:  Negative for cough, chest tightness and shortness of breath.    Cardiovascular:  Negative for chest pain, palpitations and leg swelling.   Breast: as indicated in HPI  Gastrointestinal:  Negative for abdominal pain, constipation, diarrhea and nausea.   Endocrine: Negative for cold intolerance and heat intolerance.   Genitourinary:  Negative for dysuria, frequency, hematuria, pelvic pain and vaginal bleeding.   Musculoskeletal:  Negative for arthralgias, back pain, gait problem, joint swelling and myalgias.   Skin:  Negative for color change and rash.   Allergic/Immunologic: Negative for environmental allergies and food allergies.   Neurological:  Negative for dizziness, tremors, speech difficulty, weakness, numbness and headaches.   Hematological:  Does not bruise/bleed easily.   Psychiatric/Behavioral:  Negative for agitation, dysphoric mood and sleep disturbance. The patient is not nervous/anxious.         Past Medical History  She has a past medical history of Nodules of vocal cords.     Physical Exam  Patient is alert and oriented x3 and in a relaxed and appropriate mood. Her gait is steady and hand grasps are equal. Sclera is clear. The breasts are nearly symmetrical. The tissue is soft without palpable abnormalities, discrete nodules or masses. The skin and nipples appear normal. There is no cervical, supraclavicular or axillary lymphadenopathy. Heart rate and rhythm normal, S1 and S2 appreciated. The lungs are clear to auscultation bilaterally. Abdomen is soft and non-tender.       Physical Exam     Last Recorded Vitals  There were no vitals filed for this visit.    Relevant Results   Time was spent viewing digital images of the radiology testing with the patient. I explained the results in depth, along with suggested explanation for follow up recommendations based on the testing results. BI-RADS Category 1    Imaging  BI mammo bilateral screening  tomosynthesis 07/27/2023    Narrative  Interpreted By:  MICHAEL JOSEPH MD  MRN: [id number]  Patient Name: JUAN WHITAKER    STUDY:  DIGITAL MAMM SCREENING W/ GLENDA;  7/27/2023 11:00 am    ACCESSION NUMBER(S):  [id number]    ORDERING CLINICIAN:  HAKAN GAINES    INDICATION:  Screening.    COMPARISON:  None.    FINDINGS:  2D and tomosynthesis images were reviewed at 1 mm slice thickness.    Density:  The breast tissue is almost entirely fatty.    No suspicious masses or calcifications are identified.    Impression  No mammographic evidence of malignancy.    BI-RADS CATEGORY:    Category: 1 - Negative.  Recommendation: 1 Year Screening.    For any future breast imaging appointments, please call [phone] ([phone]).    Patient letter sent SNORM    MACRO:  None       Assessment/Plan   High risk surveillance care, normal clinical exam, BRCA2+, family history of ovarian cancer, no family history of breast cancer, almost entirely fatty tissue     Plan: Return in August 2024 for bilateral screening mammogram and office visit. Full MRI now.     Patient Discussion/Summary  Your clinical examination and imaging are normal. Please return in August 2024 for bilateral screening mammogram and office visit or sooner if you have any problems or concerns.     High risk breast surveillance care plan:  Yearly mammogram with digital breast tomosynthesis  Twice yearly clinical breast examinations  Breast MRI - Full MRI now  Monthly self breast examinations  Vitamin D3 2000 IU/daily (over the counter)  Exercise 3-4 times per week for 45-60 minutes  Limit alcohol to 3-4 drinks per week   Eat a healthy low-fat diet with lots of fruits and vegetables  Risk models indicate personal risk of breast cancer in the next five years and lifetime (age 90)  Brittaney: 5 year risk ...% (average %) and lifetime risk ...% (average %)   Risk model indicates you are eligible for endocrine therapy with Tamoxifen, Raloxifene or Exemestane. Endocrine therapy  reduces lifetime risk of breast cancer by 50% when taken for 5 years.    You can see your health information, review clinical summaries from office visits & test results online when you follow your health with MY  Chart, a personal health record. To sign up go to [email or web address]. If you need assistance with signing up or trouble getting into your account call [facility] Patient Line 24/7 at [phone].    My office phone number is [phone] if you need to get in touch with me or have additional questions or concerns. Thank you for choosing [facility] and trusting me as your healthcare provider. I look forward to seeing you again at your next office visit. I am honored to be a provider on your health care team and I remain dedicated to helping you achieve your health goals.      Chayito Beard, APRN-CNP

## 2024-03-12 ENCOUNTER — APPOINTMENT (OUTPATIENT)
Dept: SURGICAL ONCOLOGY | Facility: HOSPITAL | Age: 49
End: 2024-03-12
Payer: COMMERCIAL

## 2024-05-20 ENCOUNTER — HOSPITAL ENCOUNTER (EMERGENCY)
Facility: HOSPITAL | Age: 49
Discharge: HOME | End: 2024-05-20
Payer: COMMERCIAL

## 2024-05-20 ENCOUNTER — APPOINTMENT (OUTPATIENT)
Dept: RADIOLOGY | Facility: HOSPITAL | Age: 49
End: 2024-05-20
Payer: COMMERCIAL

## 2024-05-20 VITALS
WEIGHT: 210 LBS | TEMPERATURE: 98.4 F | HEIGHT: 62 IN | RESPIRATION RATE: 18 BRPM | OXYGEN SATURATION: 99 % | HEART RATE: 59 BPM | DIASTOLIC BLOOD PRESSURE: 61 MMHG | SYSTOLIC BLOOD PRESSURE: 118 MMHG | BODY MASS INDEX: 38.64 KG/M2

## 2024-05-20 DIAGNOSIS — M25.562 ACUTE PAIN OF LEFT KNEE: Primary | ICD-10-CM

## 2024-05-20 PROCEDURE — 73564 X-RAY EXAM KNEE 4 OR MORE: CPT | Mod: LT

## 2024-05-20 PROCEDURE — 2500000001 HC RX 250 WO HCPCS SELF ADMINISTERED DRUGS (ALT 637 FOR MEDICARE OP)

## 2024-05-20 PROCEDURE — 73564 X-RAY EXAM KNEE 4 OR MORE: CPT | Mod: LEFT SIDE | Performed by: RADIOLOGY

## 2024-05-20 PROCEDURE — 99283 EMERGENCY DEPT VISIT LOW MDM: CPT

## 2024-05-20 PROCEDURE — 99284 EMERGENCY DEPT VISIT MOD MDM: CPT

## 2024-05-20 RX ORDER — KETOROLAC TROMETHAMINE 10 MG/1
10 TABLET, FILM COATED ORAL EVERY 6 HOURS PRN
Qty: 20 TABLET | Refills: 0 | Status: SHIPPED | OUTPATIENT
Start: 2024-05-20 | End: 2024-05-25

## 2024-05-20 RX ORDER — TRAMADOL HYDROCHLORIDE 50 MG/1
50 TABLET ORAL ONCE
Status: COMPLETED | OUTPATIENT
Start: 2024-05-20 | End: 2024-05-20

## 2024-05-20 RX ADMIN — TRAMADOL HYDROCHLORIDE 50 MG: 50 TABLET, COATED ORAL at 16:53

## 2024-05-20 ASSESSMENT — PAIN DESCRIPTION - ORIENTATION: ORIENTATION: LEFT

## 2024-05-20 ASSESSMENT — LIFESTYLE VARIABLES
TOTAL SCORE: 0
HAVE YOU EVER FELT YOU SHOULD CUT DOWN ON YOUR DRINKING: NO
EVER HAD A DRINK FIRST THING IN THE MORNING TO STEADY YOUR NERVES TO GET RID OF A HANGOVER: NO
HAVE PEOPLE ANNOYED YOU BY CRITICIZING YOUR DRINKING: NO
EVER FELT BAD OR GUILTY ABOUT YOUR DRINKING: NO

## 2024-05-20 ASSESSMENT — PAIN - FUNCTIONAL ASSESSMENT: PAIN_FUNCTIONAL_ASSESSMENT: 0-10

## 2024-05-20 ASSESSMENT — PAIN SCALES - GENERAL: PAINLEVEL_OUTOF10: 7

## 2024-05-20 ASSESSMENT — PAIN DESCRIPTION - PAIN TYPE: TYPE: ACUTE PAIN

## 2024-05-20 ASSESSMENT — PAIN DESCRIPTION - LOCATION: LOCATION: KNEE

## 2024-05-20 ASSESSMENT — COLUMBIA-SUICIDE SEVERITY RATING SCALE - C-SSRS
6. HAVE YOU EVER DONE ANYTHING, STARTED TO DO ANYTHING, OR PREPARED TO DO ANYTHING TO END YOUR LIFE?: NO
1. IN THE PAST MONTH, HAVE YOU WISHED YOU WERE DEAD OR WISHED YOU COULD GO TO SLEEP AND NOT WAKE UP?: NO
2. HAVE YOU ACTUALLY HAD ANY THOUGHTS OF KILLING YOURSELF?: NO

## 2024-05-20 NOTE — DISCHARGE INSTRUCTIONS
Please follow up with orthopedist for further evaluation of knee pain. If calf gets swollen and red come back to the ER.  If you notice shortness of breath or chest pain come back to the ER.  Use knee immobilizer when ambulating but you do not need to use it with rest or sleeping.    I will call if anything significant comes back on your x-ray.  Someone should call to help make an appointment with orthopedics for further evaluation.  I sent in prescription for Toradol for symptomatic control.  Please do not use this medication with ibuprofen but you can use Tylenol with it for breakthrough pain.

## 2024-05-20 NOTE — ED PROVIDER NOTES
HPI   Chief Complaint   Patient presents with    Knee Pain     Left. MRI in 2018. Was running, increased pain today. Unable to bend. + Hx of blood clots       This patient is a 49-year-old female presenting today for left knee pain.  Reports that she had an MRI in 2018 which showed a torn meniscus but she reports that it was nonsurgical.  She reports that over the past 4 to 5 years, she has lost approximately 90 pounds and the pain in her left knee gradually decreased.  She reports that she has been training for a marathon.  Reports running a 5K 1 week ago and noticed increased pain to the left knee.  2 days ago, she ran 2 miles and had to have help ambulating home at the end of it.  Reports for the past 2 days, she has been limping on her left knee and has been having help with her partner to ambulate throughout the house.  Notes that today she had to leave work due to increased pain in her left knee.  Has been taking 800 mg ibuprofen without relief.  Had leftover tramadol from previous surgery that she took last night which she found was most improvement in her pain.  Denies any lower extremity swelling in her left lower extremity though does report a history of 1 DVT in her past that was due to immobilization from a broken ankle.  No chest pain or shortness of breath.                          Trina Coma Scale Score: 15                     Patient History   Past Medical History:   Diagnosis Date    Nodules of vocal cords     Vocal fold nodule     Past Surgical History:   Procedure Laterality Date    CERVICAL BIOPSY  W/ LOOP ELECTRODE EXCISION      GALLBLADDER SURGERY  2014    Gallbladder Surgery     Family History   Problem Relation Name Age of Onset    Other (BRCA GENE POSITIVE) Mother      Hypertension Other      Diabetes Other       Social History     Tobacco Use    Smoking status: Former     Current packs/day: 0.00     Types: Cigarettes     Quit date:      Years since quittin.4    Smokeless  tobacco: Never   Substance Use Topics    Alcohol use: Yes     Comment: occasional    Drug use: Yes     Types: Marijuana     Comment: last week       Physical Exam   ED Triage Vitals [05/20/24 1503]   Temperature Heart Rate Respirations BP   36.9 °C (98.4 °F) 78 18 130/75      Pulse Ox Temp Source Heart Rate Source Patient Position   99 % Temporal Monitor Sitting      BP Location FiO2 (%)     Right arm --       Physical Exam  Vitals and nursing note reviewed.   Constitutional:       General: She is not in acute distress.     Appearance: Normal appearance. She is not ill-appearing.   HENT:      Head: Normocephalic and atraumatic.      Right Ear: External ear normal.      Left Ear: External ear normal.      Nose: Nose normal. No congestion or rhinorrhea.      Mouth/Throat:      Mouth: Mucous membranes are moist.      Pharynx: Oropharynx is clear. No oropharyngeal exudate or posterior oropharyngeal erythema.   Eyes:      Extraocular Movements: Extraocular movements intact.      Conjunctiva/sclera: Conjunctivae normal.      Pupils: Pupils are equal, round, and reactive to light.   Cardiovascular:      Rate and Rhythm: Normal rate and regular rhythm.      Heart sounds: Normal heart sounds.   Pulmonary:      Effort: No accessory muscle usage or respiratory distress.      Breath sounds: Normal breath sounds. No wheezing, rhonchi or rales.   Abdominal:      General: Abdomen is flat. Bowel sounds are normal. There is no distension.      Palpations: Abdomen is soft.      Tenderness: There is no abdominal tenderness. There is no right CVA tenderness or left CVA tenderness.   Musculoskeletal:         General: No swelling or deformity. Normal range of motion.      Cervical back: Normal range of motion and neck supple.      Right lower leg: No edema.      Left lower leg: No edema.      Comments: Mild left knee joint effusion noted.  No obvious laxity in ACL, LCL, MCL or PCL.  Negative Jaret's test.  No erythema, warmth or  swelling to the calf to be concern for DVT.  Pulses 2+, capillary refill less than 2 seconds.   Skin:     General: Skin is warm and dry.      Capillary Refill: Capillary refill takes less than 2 seconds.   Neurological:      General: No focal deficit present.      Mental Status: She is alert and oriented to person, place, and time.      GCS: GCS eye subscore is 4. GCS verbal subscore is 5. GCS motor subscore is 6.      Cranial Nerves: Cranial nerves 2-12 are intact.      Sensory: No sensory deficit.      Motor: Motor function is intact. No weakness.   Psychiatric:         Mood and Affect: Mood and affect normal.         Speech: Speech normal.         Behavior: Behavior normal. Behavior is cooperative.         ED Course & MDM   ED Course as of 05/20/24 1728   Mon May 20, 2024   1654 Patient requested to leave prior to x-ray imaging results.  RN got patient information in case patient needs to come back due to positive x-ray imaging results [ML]   1727 XR knee left 4+ views  1. Moderate nonspecific knee joint effusion.  2. No significant degenerative changes or acute radiographic findings  of the left knee.   [ML]      ED Course User Index  [ML] Carola Morris PA-C         Diagnoses as of 05/20/24 1728   Acute pain of left knee       Medical Decision Making  49-year-old female presenting today for right knee pain.  Reports a history of a meniscus tear to the left knee a few years prior that she reports was nonsurgical.  Patient reports that over the past week during her training for a marathon she has had increased pain to her knee and knee swelling.  Reports that she has been unable to fully bear weight through the has been limping around a week.  Was given tramadol for pain which she reports is moderately relieved with this.    On my exam, mild knee effusion noted.  No calf edema or swelling to be concern for DVT.  Considering this appears to be more related to overuse from running, I have very low suspicion for  DVT.  She has no laxity in the ACL, LCL, MCL or PCL.  She has a negative Jaret's test.  She does report that the pain is similar in presentation when she initially received the meniscus tear.  Considering this, I do have suspicion that there is a soft tissue injury in her knee.  Patient was placed in knee immobilizer and given crutches.  Patient was given a dose of Ultram here in the ER.  Did get an x-ray of her knee in case patient received stress fracture due to overuse from running.    Unfortunately patient request to leave prior to imaging results coming back.  I did review final read on imaging which shows mild knee effusion but no acute fracture or dislocation.  Counseled the patient on symptomatic control, elevating the knee  and icing.  Patient was also given referral for orthopedist for further evaluation.        Procedure  Procedures     Carola Morris PA-C  05/20/24 9694

## 2024-05-21 ENCOUNTER — OFFICE VISIT (OUTPATIENT)
Dept: ORTHOPEDIC SURGERY | Facility: CLINIC | Age: 49
End: 2024-05-21
Payer: COMMERCIAL

## 2024-05-21 DIAGNOSIS — M25.562 ACUTE PAIN OF LEFT KNEE: Primary | ICD-10-CM

## 2024-05-21 PROCEDURE — 99203 OFFICE O/P NEW LOW 30 MIN: CPT

## 2024-05-21 PROCEDURE — 1036F TOBACCO NON-USER: CPT

## 2024-05-21 RX ORDER — IBUPROFEN 800 MG/1
800 TABLET ORAL 3 TIMES DAILY
Qty: 90 TABLET | Refills: 0 | Status: SHIPPED | OUTPATIENT
Start: 2024-05-21 | End: 2024-06-20

## 2024-05-23 NOTE — PROGRESS NOTES
Subjective    Patient ID: Di Valentin is a 49 y.o. female.    Chief Complaint: OTHER (LT KNEE PAIN./NKI)    HPI  This is a pleasant 49-year-old female presenting to the office for evaluation of left knee pain, which has been ongoing for few weeks.  There is been no known injury.  Patient states that pain became worse when she ran a 5K over the weekend.  She has also been training for a marathon in January at [employer].  She complains of left knee pain, swelling and limited range of motion.  She is having difficulty weightbearing on left lower extremity.  She is not having any mechanical symptoms of knee giving out or buckling.  Pain is hindering activities of daily living including walking standing and stair climbing.  As pain was not improving, she presented to [facility] on May 20, 2024, where multiple view x-rays of the left knee were obtained, without evidence of acute fracture or dislocation.  She was placed in a knee immobilizer and told to weight-bear as tolerated with crutches.  Presents to the office today with continued complaints of left knee pain swelling and limited range of motion.  She continues to have difficulty weightbearing on left lower extremity.  She has been taking ibuprofen 800 mg 3 times a day, as well as limiting her weightbearing without any significant relief of symptoms.  Patient does make mention that she has lost approximately 90 pounds intentionally with attempts at weight loss.  She believes she has a history of a torn meniscus in this knee.  She is a .  She is concerned with her training for the marathon in January at [employer].    The patient's past medical, surgical, family, and social history as well as allergies and medications were reviewed and updated in the chart.    Objective   Ortho Exam  Pleasant in no acute distress.  Walks in the office today nonweightbearing on left lower extremity with use of crutches and immobilizer.  Immobilizer was  removed for assessment purposes.  Left knee appearing without soft tissue swelling erythema or ecchymosis.  There is no warmth upon touch.  There is a mild effusion.  She is tender upon palpation of left lateral tibial plateau as well as medial joint line.  Left knee range of motion is approximately 5 to 95 degrees.  Range of motion testing is difficult due to pain.  Jaret's is positive with pain laterally.  Posterior drawer and Lachman are inconclusive.  Right knee range of motion is approximately 0 to 120 degrees without effusion or instability.  Both lower extremities are well-perfused skin is intact and muscle tone is adequate.    Image Results:  XR knee left 4+ views  Narrative: Interpreted By:  Josie Bui,   STUDY:  Left knee, four views.      INDICATION:  Signs/Symptoms:l knee pain.      COMPARISON:  None.      ACCESSION NUMBER(S):  [id number]      ORDERING CLINICIAN:  MARII ANGUIANO      FINDINGS:  No acute fracture or malalignment.  Joint spaces are well maintained.  Moderate nonspecific knee joint effusion.  Soft tissues are unremarkable.      Impression: 1. Moderate nonspecific knee joint effusion.  2. No significant degenerative changes or acute radiographic findings  of the left knee.      MACRO:  None.      Signed by: Josie Bui 5/20/2024 5:20 PM  Dictation workstation:   [id number]    Multiple view x-rays of the left knee obtained at [facility] emergency department on May 20, 2024 personally reviewed, without evidence of any significant degenerative changes.  There is noted to be a moderate joint effusion which is nonspecific.    Assessment/Plan   Encounter Diagnoses:  Acute pain of left knee , concern for left knee lateral tibial plateau stress fracture from recent increased running from training from a marathon    Plan: Discussion with patient about diagnosis with review of left knee x-rays.  Differential diagnoses were discussed with the patient.  I did explain that there is  concern for an occult stress fracture of the left knee, as patient has been training for a marathon.  As she is having difficulty weightbearing, physical therapy would not be of benefit at this time.  Explained that she should obtain an MRI of the left knee to assess for an occult stress fracture or any other internal derangement of the left knee.  She will continue to nonweightbearing on left lower extremity with use of crutches.  Explained to patient though that she should begin to wean out of the immobilizer, as we do not want her knee to become stiff.  She can continue to use crutches.  She can take ibuprofen 800 mg 3 times a day as needed as well as supplement with Tylenol.  She can use ice application.  I will follow-up with patient once MRI is complete for further treatment options.  She is aware that pending MRI results, they may be need for surgical intervention.    Orders Placed This Encounter    MR knee left wo IV contrast    ibuprofen 800 mg tablet

## 2024-05-30 ENCOUNTER — APPOINTMENT (OUTPATIENT)
Dept: RADIOLOGY | Facility: CLINIC | Age: 49
End: 2024-05-30
Payer: COMMERCIAL